# Patient Record
Sex: MALE | Race: WHITE | NOT HISPANIC OR LATINO | Employment: OTHER | URBAN - METROPOLITAN AREA
[De-identification: names, ages, dates, MRNs, and addresses within clinical notes are randomized per-mention and may not be internally consistent; named-entity substitution may affect disease eponyms.]

---

## 2023-12-28 ENCOUNTER — OFFICE VISIT (OUTPATIENT)
Dept: OTOLARYNGOLOGY | Facility: CLINIC | Age: 68
End: 2023-12-28
Payer: MEDICARE

## 2023-12-28 VITALS — HEIGHT: 70 IN | TEMPERATURE: 97.3 F | WEIGHT: 230 LBS | BODY MASS INDEX: 32.93 KG/M2

## 2023-12-28 DIAGNOSIS — H91.93 DECREASED HEARING, BILATERAL: ICD-10-CM

## 2023-12-28 DIAGNOSIS — G44.209 MUSCLE TENSION HEADACHE: Primary | ICD-10-CM

## 2023-12-28 DIAGNOSIS — M54.2 CERVICAL PAIN (NECK): ICD-10-CM

## 2023-12-28 PROCEDURE — 99204 OFFICE O/P NEW MOD 45 MIN: CPT | Performed by: OTOLARYNGOLOGY

## 2023-12-28 RX ORDER — VALSARTAN 40 MG/1
40 TABLET ORAL DAILY
COMMUNITY

## 2023-12-28 RX ORDER — SIMVASTATIN 5 MG
5 TABLET ORAL
COMMUNITY

## 2023-12-28 RX ORDER — METOPROLOL SUCCINATE 50 MG/1
50 TABLET, EXTENDED RELEASE ORAL DAILY
COMMUNITY

## 2023-12-28 NOTE — PROGRESS NOTES
"Assessment/Plan:  The patient's headaches are most likely cervical musculoskeletal in etiology; I have prescribed Physical Therapy to address this.  Pt deferred a hearing test, b/c he is going to f/u at [facility] for that.  He already had an audiogram there.      Diagnosis ICD-10-CM Associated Orders   1. Muscle tension headache  G44.209 Ambulatory Referral to Physical Therapy      2. Cervical pain (neck)  M54.2 Ambulatory Referral to Physical Therapy      3. Decreased hearing, bilateral  H91.93              Subjective:      Patient ID: Ernesto Ledbetter is a 68 y.o. male.    Pt c/o pain in the sides of his head, when laying down, x 5 months.  If he lays on his right, the right side of his head begins to hurt, and vice-verse.  He does admit to a history of neck pain.  He also c/o longstanding hearing loss, R>L.        The following portions of the patient's history were reviewed and updated as appropriate: allergies, current medications, past family history, past medical history, past social history, past surgical history and problem list.    Review of Systems      Objective:      Temp (!) 97.3 °F (36.3 °C) (Temporal)   Ht 5' 9.5\" (1.765 m)   Wt 104 kg (230 lb)   BMI 33.48 kg/m²          Physical Exam  Constitutional:       Appearance: He is well-developed.   HENT:      Head: Normocephalic and atraumatic.      Right Ear: Tympanic membrane, ear canal and external ear normal. No drainage. No middle ear effusion.      Left Ear: Tympanic membrane, ear canal and external ear normal. No drainage.  No middle ear effusion.      Nose: Septal deviation present.      Mouth/Throat:      Pharynx: Uvula midline. No oropharyngeal exudate.      Tonsils: 2+ on the right. 2+ on the left.   Neck:      Thyroid: No thyroid mass or thyromegaly.      Trachea: Trachea normal. No tracheal deviation.   Lymphadenopathy:      Cervical: No cervical adenopathy.   Neurological:      Mental Status: He is alert.         "